# Patient Record
Sex: MALE | Race: WHITE
[De-identification: names, ages, dates, MRNs, and addresses within clinical notes are randomized per-mention and may not be internally consistent; named-entity substitution may affect disease eponyms.]

---

## 2023-09-29 ENCOUNTER — HOSPITAL ENCOUNTER (OUTPATIENT)
Dept: HOSPITAL 19 - SDCO | Age: 73
Discharge: HOME | End: 2023-09-29
Attending: INTERNAL MEDICINE
Payer: MEDICARE

## 2023-09-29 VITALS — TEMPERATURE: 98 F | DIASTOLIC BLOOD PRESSURE: 51 MMHG | SYSTOLIC BLOOD PRESSURE: 116 MMHG | HEART RATE: 72 BPM

## 2023-09-29 VITALS — DIASTOLIC BLOOD PRESSURE: 62 MMHG | HEART RATE: 65 BPM | SYSTOLIC BLOOD PRESSURE: 126 MMHG | TEMPERATURE: 97.6 F

## 2023-09-29 VITALS — WEIGHT: 229.5 LBS | HEIGHT: 68 IN | BODY MASS INDEX: 34.78 KG/M2

## 2023-09-29 VITALS — HEART RATE: 68 BPM | SYSTOLIC BLOOD PRESSURE: 114 MMHG | DIASTOLIC BLOOD PRESSURE: 54 MMHG

## 2023-09-29 VITALS — SYSTOLIC BLOOD PRESSURE: 115 MMHG | HEART RATE: 70 BPM | DIASTOLIC BLOOD PRESSURE: 50 MMHG

## 2023-09-29 VITALS — HEART RATE: 68 BPM | DIASTOLIC BLOOD PRESSURE: 59 MMHG | SYSTOLIC BLOOD PRESSURE: 109 MMHG

## 2023-09-29 DIAGNOSIS — K29.60: Primary | ICD-10-CM

## 2023-09-29 DIAGNOSIS — K62.1: ICD-10-CM

## 2023-09-29 DIAGNOSIS — K59.00: ICD-10-CM

## 2023-09-29 DIAGNOSIS — K31.89: ICD-10-CM

## 2023-09-29 DIAGNOSIS — D12.2: ICD-10-CM

## 2023-09-29 DIAGNOSIS — Z87.891: ICD-10-CM

## 2023-09-29 DIAGNOSIS — K57.30: ICD-10-CM

## 2023-09-29 DIAGNOSIS — K21.9: ICD-10-CM

## 2023-09-29 DIAGNOSIS — D50.9: ICD-10-CM

## 2023-09-29 NOTE — NUR
0936 PT TO BAY 5 VIA WHEEL CHAIR, USING HOME O2 AT 3L VIA NC. PT IS ALERT AND
ORIENTED, ACCOMPANIED BY HIS WIFE. CONSENT REVIEWED AND SIGNED BY PT. IV
ESTABLISHED. LR INFUSING VIA GRAVITY AT [facility]. CALL LIGHT IN REACH. DECLINED
OFFERED BLANKET.

## 2023-09-29 NOTE — NUR
1145  RETURNS TO ROOM 5 PER CART.  AWAKE, ALERT.  RESP UNLABORED AT REST.
AMBULATES TO RECLINER WITH STANDBY ASSIST.  O2/NC.  DENIES NAUSEA, ABD PAIN OR
DYSPHAGIA.  VITAL SIGNS OBTAINED.  CALL LIGHT AT SIDE. WIFE IN ROOM
1200 DISCHARGE INSTRUCTIONS REVIEWED.  PATIENT VERBALIZES UNDERSTANDING.  COPY
PROVIDED IN DISCHARGE FOLDER
1215 TOLERATES PO WATER WITHOUT NAUSEA.  SWALLOWS WITHOUT DIFFICULTY
1220 DR. JIN HERE TO VISIT WITH PATIENT
1242 DRESSES WITH ASSIST FROM WIFE